# Patient Record
Sex: FEMALE | URBAN - METROPOLITAN AREA
[De-identification: names, ages, dates, MRNs, and addresses within clinical notes are randomized per-mention and may not be internally consistent; named-entity substitution may affect disease eponyms.]

---

## 2020-12-10 ENCOUNTER — APPOINTMENT (RX ONLY)
Dept: URBAN - METROPOLITAN AREA CLINIC 28 | Facility: CLINIC | Age: 86
Setting detail: DERMATOLOGY
End: 2020-12-10

## 2020-12-10 DIAGNOSIS — L29.89 OTHER PRURITUS: ICD-10-CM

## 2020-12-10 DIAGNOSIS — L29.8 OTHER PRURITUS: ICD-10-CM

## 2020-12-10 DIAGNOSIS — L85.3 XEROSIS CUTIS: ICD-10-CM

## 2020-12-10 PROCEDURE — ? COUNSELING

## 2020-12-10 PROCEDURE — ? PRESCRIPTION

## 2020-12-10 PROCEDURE — ? PRESCRIPTION MEDICATION MANAGEMENT

## 2020-12-10 PROCEDURE — 99213 OFFICE O/P EST LOW 20 MIN: CPT

## 2020-12-10 RX ORDER — CETIRIZINE HCL 1 MG/ML
SOLUTION, ORAL ORAL QD
Qty: 30 | Refills: 3 | Status: ERX | COMMUNITY
Start: 2020-12-10

## 2020-12-10 RX ORDER — AMMONIUM LACTATE 12 %
LOTION (GRAM) TOPICAL BID
Qty: 1 | Refills: 3 | Status: ERX | COMMUNITY
Start: 2020-12-10

## 2020-12-10 RX ORDER — HYDROXYZINE HYDROCHLORIDE 25 MG/1
TABLET, FILM COATED ORAL
Qty: 30 | Refills: 3 | Status: ERX | COMMUNITY
Start: 2020-12-10

## 2020-12-10 RX ADMIN — Medication: at 00:00

## 2020-12-10 RX ADMIN — HYDROXYZINE HYDROCHLORIDE: 25 TABLET, FILM COATED ORAL at 00:00

## 2020-12-10 ASSESSMENT — LOCATION DETAILED DESCRIPTION DERM
LOCATION DETAILED: PERIUMBILICAL SKIN
LOCATION DETAILED: RIGHT PROXIMAL POSTERIOR UPPER ARM
LOCATION DETAILED: SUPERIOR LUMBAR SPINE
LOCATION DETAILED: RIGHT PROXIMAL PRETIBIAL REGION
LOCATION DETAILED: INFERIOR THORACIC SPINE
LOCATION DETAILED: LEFT PROXIMAL POSTERIOR UPPER ARM
LOCATION DETAILED: LEFT PROXIMAL PRETIBIAL REGION
LOCATION DETAILED: EPIGASTRIC SKIN

## 2020-12-10 ASSESSMENT — LOCATION ZONE DERM
LOCATION ZONE: ARM
LOCATION ZONE: TRUNK
LOCATION ZONE: LEG

## 2020-12-10 ASSESSMENT — LOCATION SIMPLE DESCRIPTION DERM
LOCATION SIMPLE: ABDOMEN
LOCATION SIMPLE: LOWER BACK
LOCATION SIMPLE: LEFT PRETIBIAL REGION
LOCATION SIMPLE: UPPER BACK
LOCATION SIMPLE: LEFT POSTERIOR UPPER ARM
LOCATION SIMPLE: RIGHT POSTERIOR UPPER ARM
LOCATION SIMPLE: RIGHT PRETIBIAL REGION

## 2020-12-10 NOTE — PROCEDURE: PRESCRIPTION MEDICATION MANAGEMENT
Detail Level: Zone
Initiate Treatment: Zyrtec 10mg-take one tab PO QAM; Hydroxyzine 25mg- take one tab po QHS
Render In Strict Bullet Format?: No
Otc Regimen: CeraVe anti-itch moisturizing lotion- apply a thin layer QDAY to dry skin of body; Dove [facility] Shampoo- wash scalp QDAY

## 2021-01-14 ENCOUNTER — APPOINTMENT (RX ONLY)
Dept: URBAN - METROPOLITAN AREA CLINIC 28 | Facility: CLINIC | Age: 87
Setting detail: DERMATOLOGY
End: 2021-01-14

## 2021-01-14 DIAGNOSIS — L29.89 OTHER PRURITUS: ICD-10-CM | Status: IMPROVED

## 2021-01-14 DIAGNOSIS — L21.8 OTHER SEBORRHEIC DERMATITIS: ICD-10-CM

## 2021-01-14 DIAGNOSIS — L29.8 OTHER PRURITUS: ICD-10-CM | Status: IMPROVED

## 2021-01-14 DIAGNOSIS — L85.3 XEROSIS CUTIS: ICD-10-CM | Status: IMPROVED

## 2021-01-14 PROCEDURE — ? PRESCRIPTION

## 2021-01-14 PROCEDURE — ? COUNSELING

## 2021-01-14 PROCEDURE — ? PRESCRIPTION MEDICATION MANAGEMENT

## 2021-01-14 PROCEDURE — 99214 OFFICE O/P EST MOD 30 MIN: CPT

## 2021-01-14 RX ORDER — CLOBETASOL PROPIONATE 0.5 MG/ML
SOLUTION TOPICAL QHS
Qty: 1 | Refills: 3 | Status: ERX | COMMUNITY
Start: 2021-01-14

## 2021-01-14 RX ADMIN — CLOBETASOL PROPIONATE: 0.5 SOLUTION TOPICAL at 00:00

## 2021-01-14 ASSESSMENT — LOCATION DETAILED DESCRIPTION DERM
LOCATION DETAILED: PERIUMBILICAL SKIN
LOCATION DETAILED: EPIGASTRIC SKIN
LOCATION DETAILED: RIGHT PROXIMAL PRETIBIAL REGION
LOCATION DETAILED: LEFT SUPERIOR PARIETAL SCALP
LOCATION DETAILED: INFERIOR THORACIC SPINE
LOCATION DETAILED: LEFT PROXIMAL POSTERIOR UPPER ARM
LOCATION DETAILED: LEFT PROXIMAL PRETIBIAL REGION
LOCATION DETAILED: RIGHT PROXIMAL POSTERIOR UPPER ARM
LOCATION DETAILED: SUPERIOR LUMBAR SPINE

## 2021-01-14 ASSESSMENT — LOCATION SIMPLE DESCRIPTION DERM
LOCATION SIMPLE: LEFT PRETIBIAL REGION
LOCATION SIMPLE: UPPER BACK
LOCATION SIMPLE: ABDOMEN
LOCATION SIMPLE: LEFT POSTERIOR UPPER ARM
LOCATION SIMPLE: RIGHT POSTERIOR UPPER ARM
LOCATION SIMPLE: RIGHT PRETIBIAL REGION
LOCATION SIMPLE: LOWER BACK
LOCATION SIMPLE: SCALP

## 2021-01-14 ASSESSMENT — LOCATION ZONE DERM
LOCATION ZONE: LEG
LOCATION ZONE: SCALP
LOCATION ZONE: ARM
LOCATION ZONE: TRUNK

## 2021-01-14 NOTE — PROCEDURE: PRESCRIPTION MEDICATION MANAGEMENT
Detail Level: Zone
Continue Regimen: Zyrtec 10mg-take one tab PO QAM; \\nHydroxyzine 25mg- take one tab po QHS PRN itching
Render In Strict Bullet Format?: No
Otc Regimen: CeraVe anti-itch moisturizing lotion- apply a thin layer QDAY to dry skin of body; Dove [facility] Shampoo- wash scalp QDAY
Initiate Treatment: clobetasol 0.05 % scalp solution QHS

## 2021-08-18 ENCOUNTER — TELEPHONE (OUTPATIENT)
Dept: SCHEDULING | Facility: CLINIC | Age: 86
End: 2021-08-18

## 2021-08-18 RX ORDER — APIXABAN 2.5 MG/1
2.5 TABLET, FILM COATED ORAL
COMMUNITY
Start: 2021-08-04 | End: 2021-08-20

## 2021-08-18 RX ORDER — AMLODIPINE BESYLATE 10 MG/1
10 TABLET ORAL
COMMUNITY
Start: 2021-08-04

## 2021-08-18 NOTE — TELEPHONE ENCOUNTER
New Patient Appointment Request    Name of caller: Lesli Luz    Diagnosis: Unknown - patient mentioned taking a medication that makes her go to the bathroom often. She did not offer much info regarding her dx, just that she wants to see a new cardiologist.    Referred by:     PCP-Talat Herring MD   [facility]   PH: [phone]  Fax: [phone]    Previous Cardiologist name and phone number: (patient will call back to add cardiologists name and tele #)    Best contact number: [phone]    Additional notes: Scheduled for 9/3/21 Dr. Spencer

## 2021-08-18 NOTE — TELEPHONE ENCOUNTER
Dedrick Austin MD subspecialty is HF Nuc Cardiology.  Perhaps patient can be offered an appt with another cardiologist unless Dr. Spencer is okay with seeing patient.  Thanks.    [facility] [address]  [phone]

## 2021-08-19 PROBLEM — N18.4 CKD (CHRONIC KIDNEY DISEASE) STAGE 4, GFR 15-29 ML/MIN (CMS/HCC): Status: ACTIVE | Noted: 2020-10-06

## 2021-08-19 PROBLEM — K92.2 LOWER GI BLEED: Status: ACTIVE | Noted: 2018-08-11

## 2021-08-19 PROBLEM — E78.5 HYPERLIPIDEMIA: Status: ACTIVE | Noted: 2018-06-07

## 2021-08-19 PROBLEM — I82.409 DVT (DEEP VENOUS THROMBOSIS) (CMS/HCC): Status: ACTIVE | Noted: 2018-06-07

## 2021-08-19 PROBLEM — I10 ESSENTIAL HYPERTENSION: Status: ACTIVE | Noted: 2018-06-07

## 2021-08-19 PROBLEM — K21.9 GERD (GASTROESOPHAGEAL REFLUX DISEASE): Status: ACTIVE | Noted: 2018-06-07

## 2021-08-19 RX ORDER — LOSARTAN POTASSIUM 100 MG/1
100 TABLET ORAL DAILY
COMMUNITY

## 2021-08-19 RX ORDER — AMMONIUM LACTATE 12 G/100G
LOTION TOPICAL AS NEEDED
COMMUNITY
Start: 2021-06-22

## 2021-08-19 RX ORDER — NITROGLYCERIN 80 MG/1
1 PATCH TRANSDERMAL DAILY
COMMUNITY
Start: 2021-08-09 | End: 2021-10-08

## 2021-08-19 RX ORDER — FUROSEMIDE 20 MG/1
20 TABLET ORAL DAILY
COMMUNITY
End: 2021-08-20

## 2021-08-19 RX ORDER — CLOBETASOL PROPIONATE 0.5 MG/ML
SOLUTION TOPICAL
COMMUNITY
End: 2021-08-20

## 2021-08-19 RX ORDER — ATORVASTATIN CALCIUM 20 MG/1
20 TABLET, FILM COATED ORAL DAILY
COMMUNITY
End: 2021-08-20

## 2021-08-19 RX ORDER — PANTOPRAZOLE SODIUM 40 MG/1
40 TABLET, DELAYED RELEASE ORAL DAILY
COMMUNITY
Start: 2021-08-09

## 2021-08-19 RX ORDER — ROSUVASTATIN CALCIUM 10 MG/1
10 TABLET, COATED ORAL DAILY
COMMUNITY
Start: 2021-08-07

## 2021-08-19 RX ORDER — CELECOXIB 100 MG/1
100 CAPSULE ORAL 2 TIMES DAILY
COMMUNITY
End: 2021-08-20

## 2021-08-19 RX ORDER — OLMESARTAN MEDOXOMIL 20 MG/1
40 TABLET ORAL DAILY
COMMUNITY
End: 2021-08-20

## 2021-08-20 ENCOUNTER — OFFICE VISIT (OUTPATIENT)
Dept: CARDIOLOGY | Facility: CLINIC | Age: 86
End: 2021-08-20
Payer: COMMERCIAL

## 2021-08-20 VITALS
HEART RATE: 70 BPM | WEIGHT: 153 LBS | SYSTOLIC BLOOD PRESSURE: 144 MMHG | BODY MASS INDEX: 28.89 KG/M2 | HEIGHT: 61 IN | DIASTOLIC BLOOD PRESSURE: 80 MMHG | OXYGEN SATURATION: 98 %

## 2021-08-20 DIAGNOSIS — R07.2 PRECORDIAL PAIN: ICD-10-CM

## 2021-08-20 DIAGNOSIS — I10 HYPERTENSION, UNSPECIFIED TYPE: Primary | ICD-10-CM

## 2021-08-20 DIAGNOSIS — I10 ESSENTIAL HYPERTENSION: ICD-10-CM

## 2021-08-20 PROBLEM — R07.9 CHEST PAIN: Status: ACTIVE | Noted: 2021-08-20

## 2021-08-20 PROBLEM — I82.509 CHRONIC DEEP VEIN THROMBOSIS (DVT) OF LOWER EXTREMITY (CMS/HCC): Status: ACTIVE | Noted: 2018-06-07

## 2021-08-20 PROCEDURE — 99204 OFFICE O/P NEW MOD 45 MIN: CPT | Performed by: INTERNAL MEDICINE

## 2021-08-20 PROCEDURE — 3008F BODY MASS INDEX DOCD: CPT | Performed by: INTERNAL MEDICINE

## 2021-08-20 PROCEDURE — 93000 ELECTROCARDIOGRAM COMPLETE: CPT | Performed by: INTERNAL MEDICINE

## 2021-08-20 RX ORDER — DOCUSATE SODIUM 100 MG/1
100 CAPSULE, LIQUID FILLED ORAL DAILY
COMMUNITY

## 2021-08-20 RX ORDER — FUROSEMIDE 40 MG/1
40 TABLET ORAL DAILY PRN
COMMUNITY

## 2021-08-20 ASSESSMENT — ENCOUNTER SYMPTOMS
ABDOMINAL PAIN: 0
DIZZINESS: 0
DYSURIA: 0
MYALGIAS: 0
WHEEZING: 0
CONSTIPATION: 1
FATIGUE: 0
BRUISES/BLEEDS EASILY: 0
NERVOUS/ANXIOUS: 0
BLOOD IN STOOL: 1
PALPITATIONS: 0
SHORTNESS OF BREATH: 1
ARTHRALGIAS: 1
APPETITE CHANGE: 0

## 2021-08-20 ASSESSMENT — PAIN SCALES - GENERAL: PAINLEVEL: 0-NO PAIN

## 2021-08-20 NOTE — ASSESSMENT & PLAN NOTE
Her chest pain is noncardiac in nature.  It does not sound typical for pericarditis or coronary ischemia.  Neither does it sound typical for GERD or costochondritis.  I do not believe she requires any cardiac testing in this regard, but I will defer to Dr. Mcghee if she feels that an ischemic evaluation is warranted.

## 2021-08-20 NOTE — PROGRESS NOTES
Electrophysiology       Reason for visit:   Chief Complaint   Patient presents with   • Hypertension   • Shortness of Breath   • Chest Pain      HPI   Lesli Luz is a 89 y.o. female who comes to the office today to establish new cardiovascular care because she is disappointed with the care she was getting from her previous cardiologist.  I do not have her previous cardiologist's notes and she gives an incomplete history.    She has a history of hypertension and no other known coronary artery disease.  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  Her main physical complaint is intermittent epigastric discomfort and shortness of breath.  The symptoms occur usually when she is standing for prolonged period.  She finds that if she drinks water or if she starts walking, the symptoms resolve.  She also describes palpitations.  When I asked her to describe them further she says her heart occasionally feels as if it is beating fast, but it does not last for more than a few minutes.  She does not describe an irregular heartbeat.  She denies symptoms that sound like angina.  She tells me that she had a stress test done at her previous cardiologist office, although I see only evidence of a previous echocardiogram in her chart and no previous stress test.        Past Medical History:   Diagnosis Date   • Chronic kidney disease    • Deep vein thrombosis (CMS/HCC)    • Hypertension    • Lipid disorder      History reviewed. No pertinent surgical history.     Allergies:  Patient has no known allergies.    Current Outpatient Medications   Medication Sig Dispense Refill   • amLODIPine (NORVASC) 10 mg tablet Take 10 mg by mouth once daily. for high blood pressure     • ammonium lactate (LAC-HYDRIN) 12 % lotion Apply topically as needed.     • apixaban (ELIQUIS) 2.5 mg tablet Take 2.5 mg by mouth 2 (two) times a day.     • docusate sodium (COLACE) 100 mg  capsule Take 100 mg by mouth daily.     • furosemide (LASIX) 40 mg tablet Take 40 mg by mouth daily.     • losartan (COZAAR) 100 mg tablet Take 100 mg by mouth daily.     • nitroglycerin (NITRODUR) 0.4 mg/hr 1 patch daily.     • pantoprazole (PROTONIX) 40 mg EC tablet Take 40 mg by mouth daily.     • rosuvastatin (CRESTOR) 10 mg tablet Take 10 mg by mouth daily.       No current facility-administered medications for this visit.     Social History     Socioeconomic History   • Marital status:      Spouse name: None   • Number of children: None   • Years of education: None   • Highest education level: None   Occupational History   • None   Tobacco Use   • Smoking status: Former Smoker   • Smokeless tobacco: Never Used   Vaping Use   • Vaping Use: Never used   Substance and Sexual Activity   • Alcohol use: Not Currently   • Drug use: Never   • Sexual activity: None   Other Topics Concern   • None   Social History Narrative   • None     Social Determinants of Health     Financial Resource Strain:    • Difficulty of Paying Living Expenses:    Food Insecurity:    • Worried About Running Out of Food in the Last Year:    • Ran Out of Food in the Last Year:    Transportation Needs:    • Lack of Transportation (Medical):    • Lack of Transportation (Non-Medical):    Physical Activity:    • Days of Exercise per Week:    • Minutes of Exercise per Session:    Stress:    • Feeling of Stress :    Social Connections:    • Frequency of Communication with Friends and Family:    • Frequency of Social Gatherings with Friends and Family:    • Attends Islam Services:    • Active Member of Clubs or Organizations:    • Attends Club or Organization Meetings:    • Marital Status:    Intimate Partner Violence:    • Fear of Current or Ex-Partner:    • Emotionally Abused:    • Physically Abused:    • Sexually Abused:      Family History   Problem Relation Age of Onset   • Cancer Biological Mother    • Breast cancer Biological Sister     • Diabetes Biological Sister      Review of Systems   Constitutional: Negative for appetite change and fatigue.   HENT: Negative for hearing loss and nosebleeds.    Eyes: Negative for visual disturbance.   Respiratory: Positive for shortness of breath. Negative for wheezing.    Cardiovascular: Positive for chest pain. Negative for palpitations.   Gastrointestinal: Positive for blood in stool and constipation. Negative for abdominal pain.   Genitourinary: Negative for dysuria.   Musculoskeletal: Positive for arthralgias. Negative for myalgias.   Skin: Negative for rash.   Neurological: Negative for dizziness.   Hematological: Does not bruise/bleed easily.   Psychiatric/Behavioral: The patient is not nervous/anxious.      Vitals:    08/20/21 0853   BP: (!) 144/80   Pulse: 70   SpO2: 98%     Wt Readings from Last 3 Encounters:   08/20/21 69.4 kg (153 lb)     Physical Exam  Constitutional:       General: She is not in acute distress.     Appearance: She is well-developed.   HENT:      Head: Atraumatic.   Eyes:      General: No scleral icterus.  Neck:      Thyroid: No thyromegaly.      Vascular: No carotid bruit.   Cardiovascular:      Rate and Rhythm: Regular rhythm.      Heart sounds: No murmur heard.     Pulmonary:      Effort: No respiratory distress.      Breath sounds: Normal breath sounds.   Abdominal:      Palpations: Abdomen is soft.      Tenderness: There is no abdominal tenderness.   Musculoskeletal:         General: No deformity.      Comments: Mild bilateral lower extremity edema, left greater than right   Skin:     Findings: No rash.   Neurological:      Mental Status: She is alert.      Motor: No abnormal muscle tone.          Labs and test results personally reviewed by me:    Labs from 11/4/2020 include CBC, comprehensive metabolic panel and troponin.  Abnormalities include a hemoglobin of 11.0, creatinine 2.3 and otherwise normal findings.    Transthoracic echo 10/7/2020: Normal LV size and function  with mild concentric LVH with EF 60% and mild diastolic dysfunction.  There are no regional wall motion abnormalities.  Mild left atrial enlargement and mild mitral regurgitation.    ECG personally read and interpreted by me today: Sinus bradycardia at 56 bpm with a slight leftward axis and no other abnormalities.         Chest pain  Her chest pain is noncardiac in nature.  It does not sound typical for pericarditis or coronary ischemia.  Neither does it sound typical for GERD or costochondritis.  I do not believe she requires any cardiac testing in this regard, but I will defer to Dr. Mcghee if she feels that an ischemic evaluation is warranted.    Essential hypertension  Her most pressing active cardiovascular issue is hypertension that is only modestly controlled on a fairly aggressive regimen.  She will need additional blood pressure control to minimize her risk of long-term cardiovascular complications and, more importantly to protect her kidneys which already show significant renal impairment.    She has no active electrophysiology issues and her cardiovascular needs are largely noninvasive.  I have asked her to see my partner Dr. Randal Mcghee for ongoing management of her cardiovascular issues.  She will schedule follow-up at her convenience.        This letter was generated using speech recognition software. Please excuse any typographical errors.       Golden Spencer MD  8/20/2021

## 2021-08-20 NOTE — ASSESSMENT & PLAN NOTE
Her most pressing active cardiovascular issue is hypertension that is only modestly controlled on a fairly aggressive regimen.  She will need additional blood pressure control to minimize her risk of long-term cardiovascular complications and, more importantly to protect her kidneys which already show significant renal impairment.    She has no active electrophysiology issues and her cardiovascular needs are largely noninvasive.  I have asked her to see my partner Dr. Randal Mcghee for ongoing management of her cardiovascular issues.  She will schedule follow-up at her convenience.

## 2021-08-20 NOTE — LETTER
August 20, 2021     Ricardo Garcia MD  [address]  [facility] [address]    Patient: Lesli Luz  YOB: 1932  Date of Visit: 8/20/2021      Dear Dr. Garcia:    Thank you for referring Lesli Luz to me for evaluation. Below are my notes for this consultation.    If you have questions, please do not hesitate to call me. I look forward to following your patient along with you.         Sincerely,        Golden Spencer MD        CC: MD Randal Cottrell MD Esberg, Douglas B, MD  8/20/2021  9:45 AM  Signed       Electrophysiology       Reason for visit:   Chief Complaint   Patient presents with   • Hypertension   • Shortness of Breath   • Chest Pain      HPI   Lesli Luz is a 89 y.o. female who comes to the office today to establish new cardiovascular care because she is disappointed with the care she was getting from her previous cardiologist.  I do not have her previous cardiologist's notes and she gives an incomplete history.    She has a history of hypertension and no other known coronary artery disease.  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  Her main physical complaint is intermittent epigastric discomfort and shortness of breath.  The symptoms occur usually when she is standing for prolonged period.  She finds that if she drinks water or if she starts walking, the symptoms resolve.  She also describes palpitations.  When I asked her to describe them further she says her heart occasionally feels as if it is beating fast, but it does not last for more than a few minutes.  She does not describe an irregular heartbeat.  She denies symptoms that sound like angina.  She tells me that she had a stress test done at her previous cardiologist office, although I see only evidence of a previous echocardiogram in her chart and no previous stress test.        Past Medical History:   Diagnosis Date    • Chronic kidney disease    • Deep vein thrombosis (CMS/HCC)    • Hypertension    • Lipid disorder      History reviewed. No pertinent surgical history.     Allergies:  Patient has no known allergies.    Current Outpatient Medications   Medication Sig Dispense Refill   • amLODIPine (NORVASC) 10 mg tablet Take 10 mg by mouth once daily. for high blood pressure     • ammonium lactate (LAC-HYDRIN) 12 % lotion Apply topically as needed.     • apixaban (ELIQUIS) 2.5 mg tablet Take 2.5 mg by mouth 2 (two) times a day.     • docusate sodium (COLACE) 100 mg capsule Take 100 mg by mouth daily.     • furosemide (LASIX) 40 mg tablet Take 40 mg by mouth daily.     • losartan (COZAAR) 100 mg tablet Take 100 mg by mouth daily.     • nitroglycerin (NITRODUR) 0.4 mg/hr 1 patch daily.     • pantoprazole (PROTONIX) 40 mg EC tablet Take 40 mg by mouth daily.     • rosuvastatin (CRESTOR) 10 mg tablet Take 10 mg by mouth daily.       No current facility-administered medications for this visit.     Social History     Socioeconomic History   • Marital status:      Spouse name: None   • Number of children: None   • Years of education: None   • Highest education level: None   Occupational History   • None   Tobacco Use   • Smoking status: Former Smoker   • Smokeless tobacco: Never Used   Vaping Use   • Vaping Use: Never used   Substance and Sexual Activity   • Alcohol use: Not Currently   • Drug use: Never   • Sexual activity: None   Other Topics Concern   • None   Social History Narrative   • None     Social Determinants of Health     Financial Resource Strain:    • Difficulty of Paying Living Expenses:    Food Insecurity:    • Worried About Running Out of Food in the Last Year:    • Ran Out of Food in the Last Year:    Transportation Needs:    • Lack of Transportation (Medical):    • Lack of Transportation (Non-Medical):    Physical Activity:    • Days of Exercise per Week:    • Minutes of Exercise per Session:    Stress:    •  Feeling of Stress :    Social Connections:    • Frequency of Communication with Friends and Family:    • Frequency of Social Gatherings with Friends and Family:    • Attends Rastafarian Services:    • Active Member of Clubs or Organizations:    • Attends Club or Organization Meetings:    • Marital Status:    Intimate Partner Violence:    • Fear of Current or Ex-Partner:    • Emotionally Abused:    • Physically Abused:    • Sexually Abused:      Family History   Problem Relation Age of Onset   • Cancer Biological Mother    • Breast cancer Biological Sister    • Diabetes Biological Sister      Review of Systems   Constitutional: Negative for appetite change and fatigue.   HENT: Negative for hearing loss and nosebleeds.    Eyes: Negative for visual disturbance.   Respiratory: Positive for shortness of breath. Negative for wheezing.    Cardiovascular: Positive for chest pain. Negative for palpitations.   Gastrointestinal: Positive for blood in stool and constipation. Negative for abdominal pain.   Genitourinary: Negative for dysuria.   Musculoskeletal: Positive for arthralgias. Negative for myalgias.   Skin: Negative for rash.   Neurological: Negative for dizziness.   Hematological: Does not bruise/bleed easily.   Psychiatric/Behavioral: The patient is not nervous/anxious.      Vitals:    08/20/21 0853   BP: (!) 144/80   Pulse: 70   SpO2: 98%     Wt Readings from Last 3 Encounters:   08/20/21 69.4 kg (153 lb)     Physical Exam  Constitutional:       General: She is not in acute distress.     Appearance: She is well-developed.   HENT:      Head: Atraumatic.   Eyes:      General: No scleral icterus.  Neck:      Thyroid: No thyromegaly.      Vascular: No carotid bruit.   Cardiovascular:      Rate and Rhythm: Regular rhythm.      Heart sounds: No murmur heard.     Pulmonary:      Effort: No respiratory distress.      Breath sounds: Normal breath sounds.   Abdominal:      Palpations: Abdomen is soft.      Tenderness: There is  no abdominal tenderness.   Musculoskeletal:         General: No deformity.      Comments: Mild bilateral lower extremity edema, left greater than right   Skin:     Findings: No rash.   Neurological:      Mental Status: She is alert.      Motor: No abnormal muscle tone.          Labs and test results personally reviewed by me:    Labs from 11/4/2020 include CBC, comprehensive metabolic panel and troponin.  Abnormalities include a hemoglobin of 11.0, creatinine 2.3 and otherwise normal findings.    Transthoracic echo 10/7/2020: Normal LV size and function with mild concentric LVH with EF 60% and mild diastolic dysfunction.  There are no regional wall motion abnormalities.  Mild left atrial enlargement and mild mitral regurgitation.    ECG personally read and interpreted by me today: Sinus bradycardia at 56 bpm with a slight leftward axis and no other abnormalities.         Chest pain  Her chest pain is noncardiac in nature.  It does not sound typical for pericarditis or coronary ischemia.  Neither does it sound typical for GERD or costochondritis.  I do not believe she requires any cardiac testing in this regard, but I will defer to Dr. Mcghee if she feels that an ischemic evaluation is warranted.    Essential hypertension  Her most pressing active cardiovascular issue is hypertension that is only modestly controlled on a fairly aggressive regimen.  She will need additional blood pressure control to minimize her risk of long-term cardiovascular complications and, more importantly to protect her kidneys which already show significant renal impairment.    She has no active electrophysiology issues and her cardiovascular needs are largely noninvasive.  I have asked her to see my partner Dr. Randal Mcghee for ongoing management of her cardiovascular issues.  She will schedule follow-up at her convenience.        This letter was generated using speech recognition software. Please excuse any typographical errors.       Golden  ED Spencer MD  8/20/2021

## 2021-10-05 ENCOUNTER — TELEPHONE (OUTPATIENT)
Dept: CARDIOLOGY | Facility: CLINIC | Age: 86
End: 2021-10-05

## 2021-10-08 ENCOUNTER — OFFICE VISIT (OUTPATIENT)
Dept: CARDIOLOGY | Facility: CLINIC | Age: 86
End: 2021-10-08
Payer: COMMERCIAL

## 2021-10-08 VITALS
HEART RATE: 70 BPM | HEIGHT: 61 IN | BODY MASS INDEX: 28.51 KG/M2 | RESPIRATION RATE: 16 BRPM | DIASTOLIC BLOOD PRESSURE: 72 MMHG | SYSTOLIC BLOOD PRESSURE: 110 MMHG | WEIGHT: 151 LBS

## 2021-10-08 DIAGNOSIS — I10 PRIMARY HYPERTENSION: Primary | ICD-10-CM

## 2021-10-08 PROCEDURE — 99204 OFFICE O/P NEW MOD 45 MIN: CPT | Performed by: INTERNAL MEDICINE

## 2021-10-08 PROCEDURE — 3008F BODY MASS INDEX DOCD: CPT | Performed by: INTERNAL MEDICINE

## 2021-10-08 PROCEDURE — 93000 ELECTROCARDIOGRAM COMPLETE: CPT | Performed by: INTERNAL MEDICINE

## 2021-10-08 NOTE — LETTER
October 8, 2021     Tracey Bills DO  [address] [facility] [address]    Patient: Lesli Luz  YOB: 1932  Date of Visit: 10/8/2021      Dear Dr. Bills:    Thank you for referring Lesli Luz to me for evaluation. Below are my notes for this consultation.    If you have questions, please do not hesitate to call me. I look forward to following your patient along with you.         Sincerely,        Randal Mcghee MD        CC: MD Taz Cottrell Riti, MD  10/8/2021  1:42 PM  Signed       Cardiology Outpatient Note    [facility] Office  [address] [place] [address]     [facility]  The [facility]  [address] [place] [address]     TEL  [phone]  [facility].[employer]/[facility]     Lesli Luz is a 89 y.o. female patient here for cardiac evaluation.  She has general cardiology issues but saw Dr. Spencer inadvertently 8-2021 had no electrophysiologic issues and was asked to see general cardiology.    This history is taken from review of the chart.  The patient's history is not very specific.    She initially went to see Dr. Spencer hoping to change her cardiology care.  She had been seen at [facility] 1-2021 with right-sided discomfort.  As best as I can tell from the notes and her history, she did not have a cardiac etiology for her symptoms which were felt to be musculoskeletal but had elevated blood pressure 154/112 mmHg as documented and was asked to see cardiology.  She saw cardiology as an outpatient in office on [facility] with a cardiologist whose name she cannot remember.  He was concerned she was having cardiac discomfort when she described having occasional epigastric discomfort.  He gave her nitroglycerin patch.  He started her on rosuvastatin.  He discontinued her nifedipine.  He put her on some other type of medication which she said she felt poorly on and then when he  increased the dose she was hallucinating.  When she told him this, he asked if she could bring in her unused medication so he could give it to someone else.  In general she did not like that recommendation and decided to switch care.    She has a history of hypertension.  No history of diabetes congestive heart failure rheumatic heart disease.  She said she had a stroke in the 1980s but had forgotten all about it.  She says her symptoms at that time consisted of perioral numbness which she currently does not have.  She denies a history of myocardial infarction, cardiac stenting, or cardiac surgery.    Her past medical history is notable for deep venous thrombosis lower extremities.  She said she first had them in 1997.  They recurred a few years later.  She says no one ever told her why she had those.  She was initially on warfarin which she said did not work.  She denies bleeding at that time.  She is currently on apixaban, managed by her primary care physician.  I have no notes or prior history or records to review.    She also has a history of chronic kidney disease with last creatinine 2022.3 and sees Dr. Tracey Bills.    Patient says that she was at a [employer] yesterday and walked a lot.  She thinks that may have improved her blood pressure today.  She denies any chest pain with ambulation.  She says occasionally she does get epigastric pain but cannot cite any mitigating or exacerbating factors.  She denies any palpitations.  She denies any diaphoresis.  She denies any visual changes.  She describes bright red blood per rectum with straining bowel movement suggestive of hemorrhoids and says she is having that worked up and has spoken with her primary care physician.  She says she previously smoked.  No clear family history of accelerated coronary artery disease.  She has had 2 pregnancies and no history of preeclampsia.                                                         [facility]     Medical History:  Past  Medical History:   Diagnosis Date   • Chronic kidney disease    • Deep vein thrombosis (CMS/HCC)    • Hypertension    • Lipid disorder        Surgical History:History reviewed. No pertinent surgical history.    Social History: reports that she has quit smoking. She has never used smokeless tobacco. She reports previous alcohol use. She reports that she does not use drugs.    Family History: She indicated that her biological mother is . She indicated that the status of her biological sister is unknown.      Allergies:Patient has no known allergies.    Current Medications:    Outpatient Encounter Medications as of 10/8/2021:   •  amLODIPine (NORVASC) 10 mg tablet, Take 10 mg by mouth once daily. for high blood pressure  •  ammonium lactate (LAC-HYDRIN) 12 % lotion, Apply topically as needed.  •  apixaban (ELIQUIS) 2.5 mg tablet, Take 2.5 mg by mouth 2 (two) times a day.  •  docusate sodium (COLACE) 100 mg capsule, Take 100 mg by mouth daily.  •  furosemide (LASIX) 40 mg tablet, Take 40 mg by mouth daily.  •  losartan (COZAAR) 100 mg tablet, Take 100 mg by mouth daily.  •  pantoprazole (PROTONIX) 40 mg EC tablet, Take 40 mg by mouth daily.  •  rosuvastatin (CRESTOR) 10 mg tablet, Take 10 mg by mouth daily.  •  [DISCONTINUED] nitroglycerin (NITRODUR) 0.4 mg/hr, 1 patch daily.                                                          OBJECTIVE   ROS as in HPI   Constitution: Negative for chills and fever.   Eyes: Negative for blurred vision and visual disturbance.   Cardiovascular: Negative for anginal chest pain, dyspnea on exertion, near-syncope, palpitations and syncope.   Respiratory: Negative for hemoptysis, shortness of breath and wheezing.    Hematologic/Lymphatic: Negative for bleeding problem.   Skin: Negative for rash. No new skin changes  Gastrointestinal: Occasional epigastric discomfort but no, diarrhea, hematochezia, melena, nausea and vomiting.   Genitourinary: Negative for dysuria and hematuria.  "  Neurological: Negative for headaches.   No history of HIV, hepatitis, opportunistic infection or blood transfusions         Vitals:    10/08/21 1255   BP: 110/72   BP Location: Left upper arm   Patient Position: Sitting   Pulse: 70   Resp: 16   Weight: 68.5 kg (151 lb)   Height: 1.549 m (5' 1\")       BP Readings from Last 3 Encounters:   10/08/21 110/72   08/20/21 (!) 144/80     Wt Readings from Last 3 Encounters:   10/08/21 68.5 kg (151 lb)   08/20/21 69.4 kg (153 lb)       Physical Exam   Constitutional: Appears comfortable in no distress  HEENT:  Neck Supple.  No JVD No carotid bruits no cervical lymphadenopathy  Head: Normocephalic.   Eyes: Extraocular movements intact  Cardiovascular: Normal rate, regular rhythm 2 out of 6 systolic murmur right sternal border Exam reveals no friction rub.    Pulmonary/Chest: Effort normal and breath sounds normal. No wheezes.  Abdominal: Soft and nontender.   Musculoskeletal: No lower extremity edema.   Neurological: Alert and oriented to person, place, and time.  Stable gait but patient says she normally walks with a cane  Skin: Skin is warm and dry.   Psychiatric: Behavior is normal.            Objective   No results found for: CHOL  No results found for: HDL  No results found for: LDLCALC  No results found for: TRIG  No results found for: ALT, AST  No results found for: WBC, HGB, HCT, PLT, INR  No results found for: GLUCOSE, NA, K, CO2, CL, BUN, CREATININE  No results found for: HGBA1C  No results found for: TSH  No results found for: BNP  [unfilled]    Troponin I Results    No lab values to display.                                                            IMAGING                     EKG 10/8/2021   Sinus  Rhythm 70bpm [id number]  Low voltage in precordial leads.    -Poor R-wave progression                                            ASSESSMENT AND PLAN   Ms. Luz is an 89-year-old woman with the following issues:    Assessment/Plan    Chest pain  Patient has very atypical " chest discomfort.  She thinks it is GI in origin.  No clear exacerbation or initiation with walking or exertion.    Recommend adequate blood pressure control, baseline echocardiogram, and patient will discontinue her nitroglycerin patch.  She has been asked to bring all her medications at time of next visit.  She is doing well and ideally testing should be minimized unless there is a clear benefit.  She agrees with this approach.    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  Currently on apixaban.    Essential hypertension  Blood pressure is currently controlled and she sees nephrology as well.  I have asked her to bring her medications at time of next visit.    Hyperlipidemia  Blood work  shows  triglycerides 69 HDL 73 normal liver function test.  In light of patient's age, the benefit of statin is questionable and I explained this to her.  She is however interested in taking anything that she thinks will help her.  She is currently on rosuvastatin as prescribed by her previous cardiologist.  She will continue for now and may have blood work in the future to see if there is an improvement in her lipid profile.              Return in about 4 weeks (around 11/5/2021).         Thank you for allowing me to participate in the care of this patient.  I hope this information is helpful.         Randal Mcghee MD [facility]  10/8/2021  1:41 PM    This document was generated utilizing voice recognition technology. A reasonable attempt at proofreading has been made to minimize errors but please excuse any typographical errors which may be present. Please call with any questions.

## 2021-10-08 NOTE — ASSESSMENT & PLAN NOTE
Patient has very atypical chest discomfort.  She thinks it is GI in origin.  No clear exacerbation or initiation with walking or exertion.    Recommend adequate blood pressure control, baseline echocardiogram, and patient will discontinue her nitroglycerin patch.  She has been asked to bring all her medications at time of next visit.  She is doing well and ideally testing should be minimized unless there is a clear benefit.  She agrees with this approach.

## 2021-10-08 NOTE — ASSESSMENT & PLAN NOTE
Blood work  shows  triglycerides 69 HDL 73 normal liver function test.  In light of patient's age, the benefit of statin is questionable and I explained this to her.  She is however interested in taking anything that she thinks will help her.  She is currently on rosuvastatin as prescribed by her previous cardiologist.  She will continue for now and may have blood work in the future to see if there is an improvement in her lipid profile.

## 2021-10-08 NOTE — ASSESSMENT & PLAN NOTE
Blood pressure is currently controlled and she sees nephrology as well.  I have asked her to bring her medications at time of next visit.

## 2021-10-08 NOTE — PROGRESS NOTES
Cardiology Outpatient Note    [facility] HEART [facility] Office  [address] [place] [address]     [facility]  The Heart [address] [facility]  [address] [place] [address]     TEL  [phone]  [facility]/mlhc     Lesli Luz is a 89 y.o. female patient here for cardiac evaluation.  She has general cardiology issues but saw Dr. Spencer inadvertently 8-2021 had no electrophysiologic issues and was asked to see general cardiology.    This history is taken from review of the chart.  The patient's history is not very specific.    She initially went to see Dr. Spencer hoping to change her cardiology care.  She had been seen at [facility] 1-2021 with right-sided discomfort.  As best as I can tell from the notes and her history, she did not have a cardiac etiology for her symptoms which were felt to be musculoskeletal but had elevated blood pressure 154/112 mmHg as documented and was asked to see cardiology.  She saw cardiology as an outpatient in office on [facility] with a cardiologist whose name she cannot remember.  He was concerned she was having cardiac discomfort when she described having occasional epigastric discomfort.  He gave her nitroglycerin patch.  He started her on rosuvastatin.  He discontinued her nifedipine.  He put her on some other type of medication which she said she felt poorly on and then when he increased the dose she was hallucinating.  When she told him this, he asked if she could bring in her unused medication so he could give it to someone else.  In general she did not like that recommendation and decided to switch care.    She has a history of hypertension.  No history of diabetes congestive heart failure rheumatic heart disease.  She said she had a stroke in the 1980s but had forgotten all about it.  She says her symptoms at that time consisted of perioral numbness which she currently does not have.  She denies a  history of myocardial infarction, cardiac stenting, or cardiac surgery.    Her past medical history is notable for deep venous thrombosis lower extremities.  She said she first had them in .  They recurred a few years later.  She says no one ever told her why she had those.  She was initially on warfarin which she said did not work.  She denies bleeding at that time.  She is currently on apixaban, managed by her primary care physician.  I have no notes or prior history or records to review.    She also has a history of chronic kidney disease with last creatinine .3 and sees Dr. Tracey Bills.    Patient says that she was at a [employer] yesterday and walked a lot.  She thinks that may have improved her blood pressure today.  She denies any chest pain with ambulation.  She says occasionally she does get epigastric pain but cannot cite any mitigating or exacerbating factors.  She denies any palpitations.  She denies any diaphoresis.  She denies any visual changes.  She describes bright red blood per rectum with straining bowel movement suggestive of hemorrhoids and says she is having that worked up and has spoken with her primary care physician.  She says she previously smoked.  No clear family history of accelerated coronary artery disease.  She has had 2 pregnancies and no history of preeclampsia.                                                         [facility]     Medical History:  Past Medical History:   Diagnosis Date   • Chronic kidney disease    • Deep vein thrombosis (CMS/HCC)    • Hypertension    • Lipid disorder        Surgical History:History reviewed. No pertinent surgical history.    Social History: reports that she has quit smoking. She has never used smokeless tobacco. She reports previous alcohol use. She reports that she does not use drugs.    Family History: She indicated that her biological mother is . She indicated that the status of her biological sister is  "unknown.      Allergies:Patient has no known allergies.    Current Medications:    Outpatient Encounter Medications as of 10/8/2021:   •  amLODIPine (NORVASC) 10 mg tablet, Take 10 mg by mouth once daily. for high blood pressure  •  ammonium lactate (LAC-HYDRIN) 12 % lotion, Apply topically as needed.  •  apixaban (ELIQUIS) 2.5 mg tablet, Take 2.5 mg by mouth 2 (two) times a day.  •  docusate sodium (COLACE) 100 mg capsule, Take 100 mg by mouth daily.  •  furosemide (LASIX) 40 mg tablet, Take 40 mg by mouth daily.  •  losartan (COZAAR) 100 mg tablet, Take 100 mg by mouth daily.  •  pantoprazole (PROTONIX) 40 mg EC tablet, Take 40 mg by mouth daily.  •  rosuvastatin (CRESTOR) 10 mg tablet, Take 10 mg by mouth daily.  •  [DISCONTINUED] nitroglycerin (NITRODUR) 0.4 mg/hr, 1 patch daily.                                                          OBJECTIVE   ROS as in HPI   Constitution: Negative for chills and fever.   Eyes: Negative for blurred vision and visual disturbance.   Cardiovascular: Negative for anginal chest pain, dyspnea on exertion, near-syncope, palpitations and syncope.   Respiratory: Negative for hemoptysis, shortness of breath and wheezing.    Hematologic/Lymphatic: Negative for bleeding problem.   Skin: Negative for rash. No new skin changes  Gastrointestinal: Occasional epigastric discomfort but no, diarrhea, hematochezia, melena, nausea and vomiting.   Genitourinary: Negative for dysuria and hematuria.   Neurological: Negative for headaches.   No history of HIV, hepatitis, opportunistic infection or blood transfusions         Vitals:    10/08/21 1255   BP: 110/72   BP Location: Left upper arm   Patient Position: Sitting   Pulse: 70   Resp: 16   Weight: 68.5 kg (151 lb)   Height: 1.549 m (5' 1\")       BP Readings from Last 3 Encounters:   10/08/21 110/72   08/20/21 (!) 144/80     Wt Readings from Last 3 Encounters:   10/08/21 68.5 kg (151 lb)   08/20/21 69.4 kg (153 lb)       Physical Exam "   Constitutional: Appears comfortable in no distress  HEENT:  Neck Supple.  No JVD No carotid bruits no cervical lymphadenopathy  Head: Normocephalic.   Eyes: Extraocular movements intact  Cardiovascular: Normal rate, regular rhythm 2 out of 6 systolic murmur right sternal border Exam reveals no friction rub.    Pulmonary/Chest: Effort normal and breath sounds normal. No wheezes.  Abdominal: Soft and nontender.   Musculoskeletal: No lower extremity edema.   Neurological: Alert and oriented to person, place, and time.  Stable gait but patient says she normally walks with a cane  Skin: Skin is warm and dry.   Psychiatric: Behavior is normal.            Objective   No results found for: CHOL  No results found for: HDL  No results found for: LDLCALC  No results found for: TRIG  No results found for: ALT, AST  No results found for: WBC, HGB, HCT, PLT, INR  No results found for: GLUCOSE, NA, K, CO2, CL, BUN, CREATININE  No results found for: HGBA1C  No results found for: TSH  No results found for: BNP  [unfilled]    Troponin I Results    No lab values to display.                                                            IMAGING                     EKG 10/8/2021   Sinus  Rhythm 70bpm [id number]  Low voltage in precordial leads.    -Poor R-wave progression                                            ASSESSMENT AND PLAN   Ms. Luz is an 89-year-old woman with the following issues:    Assessment/Plan    Chest pain  Patient has very atypical chest discomfort.  She thinks it is GI in origin.  No clear exacerbation or initiation with walking or exertion.    Recommend adequate blood pressure control, baseline echocardiogram, and patient will discontinue her nitroglycerin patch.  She has been asked to bring all her medications at time of next visit.  She is doing well and ideally testing should be minimized unless there is a clear benefit.  She agrees with this approach.    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  Currently on apixaban.    Essential hypertension  Blood pressure is currently controlled and she sees nephrology as well.  I have asked her to bring her medications at time of next visit.    Hyperlipidemia  Blood work  shows  triglycerides 69 HDL 73 normal liver function test.  In light of patient's age, the benefit of statin is questionable and I explained this to her.  She is however interested in taking anything that she thinks will help her.  She is currently on rosuvastatin as prescribed by her previous cardiologist.  She will continue for now and may have blood work in the future to see if there is an improvement in her lipid profile.              Return in about 4 weeks (around 11/5/2021).         Thank you for allowing me to participate in the care of this patient.  I hope this information is helpful.         Randal Mcghee MD [facility]  10/8/2021  1:41 PM    This document was generated utilizing voice recognition technology. A reasonable attempt at proofreading has been made to minimize errors but please excuse any typographical errors which may be present. Please call with any questions.

## 2021-10-28 ENCOUNTER — TELEPHONE (OUTPATIENT)
Dept: CARDIOLOGY | Facility: CLINIC | Age: 86
End: 2021-10-28

## 2021-10-28 NOTE — TELEPHONE ENCOUNTER
Norberto Ballesteros is scheduled for an ECHO in the [facility] office on 11-  Insurance is [employer] medicare ID # [id number]   1932    Will need precert  thx

## 2021-11-10 ENCOUNTER — OFFICE VISIT (OUTPATIENT)
Dept: CARDIOLOGY | Facility: CLINIC | Age: 86
End: 2021-11-10
Payer: COMMERCIAL

## 2021-11-10 ENCOUNTER — HOSPITAL ENCOUNTER (OUTPATIENT)
Dept: CARDIOLOGY | Facility: CLINIC | Age: 86
Discharge: HOME | End: 2021-11-10
Attending: INTERNAL MEDICINE
Payer: COMMERCIAL

## 2021-11-10 VITALS
SYSTOLIC BLOOD PRESSURE: 126 MMHG | WEIGHT: 144 LBS | BODY MASS INDEX: 27.19 KG/M2 | DIASTOLIC BLOOD PRESSURE: 70 MMHG | HEIGHT: 61 IN

## 2021-11-10 VITALS
HEART RATE: 58 BPM | SYSTOLIC BLOOD PRESSURE: 126 MMHG | RESPIRATION RATE: 16 BRPM | DIASTOLIC BLOOD PRESSURE: 60 MMHG | BODY MASS INDEX: 27.26 KG/M2 | HEIGHT: 61 IN | WEIGHT: 144.4 LBS

## 2021-11-10 DIAGNOSIS — I10 PRIMARY HYPERTENSION: Primary | ICD-10-CM

## 2021-11-10 DIAGNOSIS — I10 PRIMARY HYPERTENSION: ICD-10-CM

## 2021-11-10 LAB
AORTIC ROOT ANNULUS - M-MODE: 3 CM
AORTIC ROOT ANNULUS: 2.9 CM
ASCENDING AORTA: 3 CM
AV PEAK GRADIENT: 15 MMHG
AV PEAK VELOCITY-S: 1.93 M/S
AV VALVE AREA: 2.08 CM2
BSA FOR ECHO PROCEDURE: 1.68 M2
CUSP SEPARATION: 1.8 CM
DOP CALC LVOT STROKE VOLUME: 93.82 ML
E WAVE DECELERATION TIME: 408 MS
E/A RATIO: 0.6
E/E' RATIO: 18.9
E/LAT E' RATIO: 11.4
EDV (BP): 61 CM3
EF (A4C): 65.2 %
EJECTION FRACTION: 60.5 %
EST RIGHT VENT SYSTOLIC PRESSURE BY TRICUSPID REGURGITATION JET: 39 MMHG
ESV (BP): 24.1 CM3
FRACTIONAL SHORTENING: 38.6 %
INTERVENTRICULAR SEPTUM: 0.9 CM
LA/AORTA RATIO: 1.55
LAAS-AP2: 23.6 CM2
LAAS-AP4: 23.2 CM2
LAD 2D - M-MODE: 4.6 CM
LAD 2D - M-MODE: 4.6 CM
LAD 2D: 4.5 CM
LALD A4C: 5.39 CM
LALD A4C: 5.61 CM
LEFT ATRIUM VOLUME INDEX: 48.04 CM3/M2
LEFT ATRIUM VOLUME: 80.7 CM3
LEFT INTERNAL DIMENSION IN SYSTOLE: 2.55 CM (ref 2.33–3.52)
LEFT VENTRICLE DIASTOLIC VOLUME INDEX: 35 CM3/M2
LEFT VENTRICLE DIASTOLIC VOLUME: 58.8 CM3
LEFT VENTRICLE SYSTOLIC VOLUME INDEX: 12.2 CM3/M2
LEFT VENTRICLE SYSTOLIC VOLUME: 20.5 CM3
LEFT VENTRICULAR INTERNAL DIMENSION IN DIASTOLE: 4.15 CM (ref 3.92–5.44)
LEFT VENTRICULAR POSTERIOR WALL IN END DIASTOLE: 0.87 CM (ref 0.51–0.95)
LV DIASTOLIC VOLUME: 60.7 CM3
LV ESV (APICAL 2 CHAMBER): 27.9 CM3
LVAD-AP2: 22.2 CM2
LVAD-AP4: 21.9 CM2
LVAS-AP2: 13.2 CM2
LVAS-AP4: 11.8 CM2
LVEDVI(A2C): 36.13 CM3/M2
LVEDVI(BP): 36.31 CM3/M2
LVESVI(A2C): 16.61 CM3/M2
LVESVI(BP): 14.35 CM3/M2
LVLD-AP2: 6.87 CM
LVLD-AP4: 7.19 CM
LVLS-AP2: 5.84 CM
LVLS-AP4: 6.01 CM
LVOT 2D: 2.1 CM
LVOT A: 3.46 CM2
LVOT MG: 3 MMHG
LVOT MV: 0.72 M/S
LVOT PEAK VELOCITY: 1.16 M/S
LVOT VTI: 27.1 CM
MV E'TISSUE VEL-LAT: 0.06 M/S
MV E'TISSUE VEL-MED: 0.04 M/S
MV PEAK A VEL: 1.3 M/S
MV PEAK E VEL: 0.72 M/S
POSTERIOR WALL: 0.87 CM
PV PEAK GRADIENT: 6 MMHG
PV PV: 1.22 M/S
RVOT VMAX: 0.83 M/S
RVOT VTI: 17 CM
SEPTAL TISSUE DOPPLER FREE WALL LATE DIA VELOCITY (APICAL 4 CHAMBER VIEW): 0.16 M/S
TR MAX PG: 36 MMHG
TRICUSPID VALVE PEAK REGURGITATION VELOCITY: 2.99 M/S
Z-SCORE OF LEFT VENTRICULAR DIMENSION IN END DIASTOLE: -1.06
Z-SCORE OF LEFT VENTRICULAR DIMENSION IN END SYSTOLE: -0.91
Z-SCORE OF LEFT VENTRICULAR POSTERIOR WALL IN END DIASTOLE: 1.19

## 2021-11-10 PROCEDURE — 99214 OFFICE O/P EST MOD 30 MIN: CPT | Performed by: INTERNAL MEDICINE

## 2021-11-10 PROCEDURE — 3008F BODY MASS INDEX DOCD: CPT | Performed by: INTERNAL MEDICINE

## 2021-11-10 PROCEDURE — 93306 TTE W/DOPPLER COMPLETE: CPT | Performed by: INTERNAL MEDICINE

## 2021-11-10 NOTE — LETTER
November 10, 2021     Talat Herring MD  [address] [facility] [address]    Patient: Lesli Luz  YOB: 1932  Date of Visit: 11/10/2021      Dear Dr. Herring:    Thank you for referring Lesli Luz to me for evaluation. Below are my notes for this consultation.    If you have questions, please do not hesitate to call me. I look forward to following your patient along with you.         Sincerely,        Randal Mcghee MD        CC: No Recipients  Randal Mcghee MD  11/10/2021 11:00 AM  Signed       Cardiology Outpatient Note    [facility] Office  [address] [place] [address]     [facility]  [facility]  [address] [place] [address]     TEL  [phone]  [facility]hc     Lesli Luz is a 89 y.o. female patient here for cardiac evaluation.  She has general cardiology issues but saw Dr. Spencer inadvertently 8-2021 had no electrophysiologic issues and was asked to see general cardiology.    This history is taken from review of the chart.  The patient's history is not very specific.    She initially went to see Dr. Spencer hoping to change her cardiology care.  She had been seen at [facility] 1-2021 with right-sided discomfort.  As best as I can tell from the notes and her history, she did not have a cardiac etiology for her symptoms which were felt to be musculoskeletal but had elevated blood pressure 154/112 mmHg as documented and was asked to see cardiology.  She saw cardiology as an outpatient in office on [facility] with a cardiologist whose name she cannot remember.      She had an echocardiogram in the office today which I personally reviewed.  Patient has normal left ventricular function and normal wall motion.  Her blood pressure is controlled today.  She still gets occasional right-sided discomfort but epigastric discomfort also when she drinks liquids.  She says she got a  "call from her primary care physician's office telling her that she had \" bad bacteria in her stomach\" but denies ever having an EGD.  She said she will be scheduling consultation with gastroenterology.  Blood work  also shows hemoglobin 9.5.  Hemoglobin  was 11.0.  She denies melena but has described hemorrhoidal bleeding.  She says she has an appointment scheduled with hematology as well.    She has a history of hypertension.  No history of diabetes congestive heart failure rheumatic heart disease.  She said she had a stroke in the  but had forgotten all about it.  She says her symptoms at that time consisted of perioral numbness which she currently does not have.  She denies a history of myocardial infarction, cardiac stenting, or cardiac surgery.    Her past medical history is notable for deep venous thrombosis lower extremities.  She said she first had them in .  They recurred a few years later.  She says no one ever told her why she had those.  She was initially on warfarin which she said did not work.  She denies bleeding at that time.  She is currently on apixaban, managed by her primary care physician.  I have no notes or prior history or records to review.    She also has a history of chronic kidney disease with last creatinine 2.93 and sees Dr. Tracey Bills.                                                             [facility]     Medical History:  Past Medical History:   Diagnosis Date   • Chronic kidney disease    • Deep vein thrombosis (CMS/HCC)    • Hypertension    • Lipid disorder        Surgical History:No past surgical history on file.    Social History: reports that she has quit smoking. She has never used smokeless tobacco. She reports previous alcohol use. She reports that she does not use drugs.    Family History: She indicated that her biological mother is . She indicated that the status of her biological sister is unknown.      Allergies:Patient has no known " "allergies.    Current Medications:    Outpatient Encounter Medications as of 11/10/2021:   •  amLODIPine (NORVASC) 10 mg tablet, Take 10 mg by mouth once daily. for high blood pressure  •  ammonium lactate (LAC-HYDRIN) 12 % lotion, Apply topically as needed.  •  apixaban (ELIQUIS) 2.5 mg tablet, Take 2.5 mg by mouth 2 (two) times a day.  •  docusate sodium (COLACE) 100 mg capsule, Take 100 mg by mouth daily.  •  furosemide (LASIX) 40 mg tablet, Take 40 mg by mouth daily as needed.    •  losartan (COZAAR) 100 mg tablet, Take 100 mg by mouth daily.  •  rosuvastatin (CRESTOR) 10 mg tablet, Take 10 mg by mouth daily.  •  pantoprazole (PROTONIX) 40 mg EC tablet, Take 40 mg by mouth daily.                                                          OBJECTIVE   ROS as in HPI   Constitution: Negative for chills and fever.   Eyes: Negative for blurred vision and visual disturbance.   Cardiovascular: Negative for anginal chest pain, dyspnea on exertion, near-syncope, palpitations and syncope.   Respiratory: Negative for hemoptysis, shortness of breath and wheezing.    Hematologic/Lymphatic: Negative for bleeding problem.   Skin: Negative for rash. No new skin changes  Gastrointestinal: Occasional epigastric discomfort but no, diarrhea, hematochezia, melena, nausea and vomiting.   Genitourinary: Negative for dysuria and hematuria.   Neurological: Negative for headaches.            Vitals:    11/10/21 1021   BP: 126/60   BP Location: Left upper arm   Patient Position: Sitting   Pulse: (!) 58   Resp: 16   Weight: 65.5 kg (144 lb 6.4 oz)   Height: 1.549 m (5' 1\")       BP Readings from Last 3 Encounters:   11/10/21 126/60   11/10/21 126/70   10/08/21 110/72     Wt Readings from Last 3 Encounters:   11/10/21 65.5 kg (144 lb 6.4 oz)   11/10/21 65.3 kg (144 lb)   10/08/21 68.5 kg (151 lb)       Physical Exam   Constitutional: Appears comfortable in no distress  HEENT:  Neck Supple.  No JVD No carotid bruits no cervical " lymphadenopathy  Head: Normocephalic.   Eyes: Extraocular movements intact  Cardiovascular: Normal rate, regular rhythm 2 out of 6 systolic murmur right sternal border Exam reveals no friction rub.    Pulmonary/Chest: Effort normal and breath sounds normal. No wheezes.  Abdominal: Soft and nontender.   Musculoskeletal: No lower extremity edema.   Neurological: Alert and oriented to person, place, and time.  Stable gait but patient says she normally walks with a cane  Skin: Skin is warm and dry.   Psychiatric: Behavior is normal.            Objective   No results found for: CHOL  No results found for: HDL  No results found for: LDLCALC  No results found for: TRIG  No results found for: ALT, AST  No results found for: WBC, HGB, HCT, PLT, INR  No results found for: GLUCOSE, NA, K, CO2, CL, BUN, CREATININE  No results found for: HGBA1C  No results found for: TSH  No results found for: BNP  [unfilled]    Troponin I Results    No lab values to display.                                                            IMAGING     Transthoracic echocardiogram 11-10-21  · Normal-sized LV. Estimated EF 65%. No regional wall motion abnormalities. Normal LV wall thickness. LV diastolic inflow pattern consistent with impaired relaxation.  · No evidence of pericardial effusion.  · Aortic root sclerotic.  · Normal leaflet motion of the mitral valve. Sclerotic mitral valve. Mild mitral annular calcification.  · Tricuspid aortic valve. Sclerotic aortic valve leaflets. No aortic valve regurgitation. No aortic valve stenosis.  · Mild tricuspid valve regurgitation. The regurgitation jet is eccentric.  · Estimated RVSP = 39 mmHg.  · Pulmonic valve not well visualized.  · Normal-sized RA.  · Normal-sized RV. Normal RV systolic function.  · Mildly dilated LA.  · Trace mitral valve regurgitation.  · IVC is a small caliber vessel (<1.7cm). IVC collapses >50% during inspiration.        EKG 11/10/2021   Sinus  Rhythm 70bpm [id number]  Low voltage in  precordial leads.    -Poor R-wave progression                                            ASSESSMENT AND PLAN   Ms. Luz is an 89-year-old woman with chronic kidney disease, hypertension, chronic deep venous thrombosis and the following issues:    Assessment/Plan    Chest pain  Patient has atypical chest discomfort which does not appear to be anginal or cardiac in origin.  No clear exacerbation with exertion or walking.  Echocardiogram showed normal left ventricular function with normal wall motion and mild degenerative valvular disease.  No further cardiac testing recommended at this time.  Patient will follow up with gastroenterology.      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  Currently on apixaban.    Hyperlipidemia  Blood work  shows  triglycerides 69 HDL 73 normal liver function test.  In light of patient's age, the benefit of statin is questionable and I explained this to her.  She is however interested in taking anything that she thinks will help her.  She is currently on rosuvastatin as prescribed by her previous cardiologist.  She will continue for now and may have blood work in the future to see if there is an improvement in her lipid profile.              Return in about 6 months (around 5/10/2022).         Thank you for allowing me to participate in the care of this patient.  I hope this information is helpful.         Randal Mcghee MD [facility]  11/10/2021  10:59 AM    This document was generated utilizing voice recognition technology. A reasonable attempt at proofreading has been made to minimize errors but please excuse any typographical errors which may be present. Please call with any questions.

## 2021-11-10 NOTE — PROGRESS NOTES
"     Cardiology Outpatient Note    [facility]    [facility] Office  [address] [place] [address]     [facility]  [facility] [address] [facility]  [address] [place] [address]     TEL  [phone]  [facility]/mlhc     Lesli Luz is a 89 y.o. female patient here for cardiac evaluation.  She has general cardiology issues but saw Dr. Spencer inadvertently 8-2021 had no electrophysiologic issues and was asked to see general cardiology.    This history is taken from review of the chart.  The patient's history is not very specific.    She initially went to see Dr. Spencer hoping to change her cardiology care.  She had been seen at [facility] 1-2021 with right-sided discomfort.  As best as I can tell from the notes and her history, she did not have a cardiac etiology for her symptoms which were felt to be musculoskeletal but had elevated blood pressure 154/112 mmHg as documented and was asked to see cardiology.  She saw cardiology as an outpatient in office on [facility] with a cardiologist whose name she cannot remember.      She had an echocardiogram in the office today which I personally reviewed.  Patient has normal left ventricular function and normal wall motion.  Her blood pressure is controlled today.  She still gets occasional right-sided discomfort but epigastric discomfort also when she drinks liquids.  She says she got a call from her primary care physician's office telling her that she had \" bad bacteria in her stomach\" but denies ever having an EGD.  She said she will be scheduling consultation with gastroenterology.  Blood work 9-2021 also shows hemoglobin 9.5.  Hemoglobin  was 11.0.  She denies melena but has described hemorrhoidal bleeding.  She says she has an appointment scheduled with hematology as well.    She has a history of hypertension.  No history of diabetes congestive heart failure rheumatic heart disease.  She " said she had a stroke in the  but had forgotten all about it.  She says her symptoms at that time consisted of perioral numbness which she currently does not have.  She denies a history of myocardial infarction, cardiac stenting, or cardiac surgery.    Her past medical history is notable for deep venous thrombosis lower extremities.  She said she first had them in .  They recurred a few years later.  She says no one ever told her why she had those.  She was initially on warfarin which she said did not work.  She denies bleeding at that time.  She is currently on apixaban, managed by her primary care physician.  I have no notes or prior history or records to review.    She also has a history of chronic kidney disease with last creatinine 2.93 and sees Dr. Tracey Bills.                                                             [facility]     Medical History:  Past Medical History:   Diagnosis Date   • Chronic kidney disease    • Deep vein thrombosis (CMS/HCC)    • Hypertension    • Lipid disorder        Surgical History:No past surgical history on file.    Social History: reports that she has quit smoking. She has never used smokeless tobacco. She reports previous alcohol use. She reports that she does not use drugs.    Family History: She indicated that her biological mother is . She indicated that the status of her biological sister is unknown.      Allergies:Patient has no known allergies.    Current Medications:    Outpatient Encounter Medications as of 11/10/2021:   •  amLODIPine (NORVASC) 10 mg tablet, Take 10 mg by mouth once daily. for high blood pressure  •  ammonium lactate (LAC-HYDRIN) 12 % lotion, Apply topically as needed.  •  apixaban (ELIQUIS) 2.5 mg tablet, Take 2.5 mg by mouth 2 (two) times a day.  •  docusate sodium (COLACE) 100 mg capsule, Take 100 mg by mouth daily.  •  furosemide (LASIX) 40 mg tablet, Take 40 mg by mouth daily as needed.    •  losartan (COZAAR) 100 mg tablet, Take 100  "mg by mouth daily.  •  rosuvastatin (CRESTOR) 10 mg tablet, Take 10 mg by mouth daily.  •  pantoprazole (PROTONIX) 40 mg EC tablet, Take 40 mg by mouth daily.                                                          OBJECTIVE   ROS as in HPI   Constitution: Negative for chills and fever.   Eyes: Negative for blurred vision and visual disturbance.   Cardiovascular: Negative for anginal chest pain, dyspnea on exertion, near-syncope, palpitations and syncope.   Respiratory: Negative for hemoptysis, shortness of breath and wheezing.    Hematologic/Lymphatic: Negative for bleeding problem.   Skin: Negative for rash. No new skin changes  Gastrointestinal: Occasional epigastric discomfort but no, diarrhea, hematochezia, melena, nausea and vomiting.   Genitourinary: Negative for dysuria and hematuria.   Neurological: Negative for headaches.            Vitals:    11/10/21 1021   BP: 126/60   BP Location: Left upper arm   Patient Position: Sitting   Pulse: (!) 58   Resp: 16   Weight: 65.5 kg (144 lb 6.4 oz)   Height: 1.549 m (5' 1\")       BP Readings from Last 3 Encounters:   11/10/21 126/60   11/10/21 126/70   10/08/21 110/72     Wt Readings from Last 3 Encounters:   11/10/21 65.5 kg (144 lb 6.4 oz)   11/10/21 65.3 kg (144 lb)   10/08/21 68.5 kg (151 lb)       Physical Exam   Constitutional: Appears comfortable in no distress  HEENT:  Neck Supple.  No JVD No carotid bruits no cervical lymphadenopathy  Head: Normocephalic.   Eyes: Extraocular movements intact  Cardiovascular: Normal rate, regular rhythm 2 out of 6 systolic murmur right sternal border Exam reveals no friction rub.    Pulmonary/Chest: Effort normal and breath sounds normal. No wheezes.  Abdominal: Soft and nontender.   Musculoskeletal: No lower extremity edema.   Neurological: Alert and oriented to person, place, and time.  Stable gait but patient says she normally walks with a cane  Skin: Skin is warm and dry.   Psychiatric: Behavior is normal.          "   Objective   No results found for: CHOL  No results found for: HDL  No results found for: LDLCALC  No results found for: TRIG  No results found for: ALT, AST  No results found for: WBC, HGB, HCT, PLT, INR  No results found for: GLUCOSE, NA, K, CO2, CL, BUN, CREATININE  No results found for: HGBA1C  No results found for: TSH  No results found for: BNP  [unfilled]    Troponin I Results    No lab values to display.                                                            IMAGING     Transthoracic echocardiogram 11-10-21  · Normal-sized LV. Estimated EF 65%. No regional wall motion abnormalities. Normal LV wall thickness. LV diastolic inflow pattern consistent with impaired relaxation.  · No evidence of pericardial effusion.  · Aortic root sclerotic.  · Normal leaflet motion of the mitral valve. Sclerotic mitral valve. Mild mitral annular calcification.  · Tricuspid aortic valve. Sclerotic aortic valve leaflets. No aortic valve regurgitation. No aortic valve stenosis.  · Mild tricuspid valve regurgitation. The regurgitation jet is eccentric.  · Estimated RVSP = 39 mmHg.  · Pulmonic valve not well visualized.  · Normal-sized RA.  · Normal-sized RV. Normal RV systolic function.  · Mildly dilated LA.  · Trace mitral valve regurgitation.  · IVC is a small caliber vessel (<1.7cm). IVC collapses >50% during inspiration.        EKG 11/10/2021   Sinus  Rhythm 70bpm [id number]  Low voltage in precordial leads.    -Poor R-wave progression                                            ASSESSMENT AND PLAN   Ms. Luz is an 89-year-old woman with chronic kidney disease, hypertension, chronic deep venous thrombosis and the following issues:    Assessment/Plan    Chest pain  Patient has atypical chest discomfort which does not appear to be anginal or cardiac in origin.  No clear exacerbation with exertion or walking.  Echocardiogram showed normal left ventricular function with normal wall motion and mild degenerative valvular disease.   No further cardiac testing recommended at this time.  Patient will follow up with gastroenterology.      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  Currently on apixaban.    Hyperlipidemia  Blood work  shows  triglycerides 69 HDL 73 normal liver function test.  In light of patient's age, the benefit of statin is questionable and I explained this to her.  She is however interested in taking anything that she thinks will help her.  She is currently on rosuvastatin as prescribed by her previous cardiologist.  She will continue for now and may have blood work in the future to see if there is an improvement in her lipid profile.              Return in about 6 months (around 5/10/2022).         Thank you for allowing me to participate in the care of this patient.  I hope this information is helpful.         Randal Mcghee MD [facility]  11/10/2021  10:59 AM    This document was generated utilizing voice recognition technology. A reasonable attempt at proofreading has been made to minimize errors but please excuse any typographical errors which may be present. Please call with any questions.

## 2021-11-10 NOTE — ASSESSMENT & PLAN NOTE
Patient has atypical chest discomfort which does not appear to be anginal or cardiac in origin.  No clear exacerbation with exertion or walking.  Echocardiogram showed normal left ventricular function with normal wall motion and mild degenerative valvular disease.  No further cardiac testing recommended at this time.  Patient will follow up with gastroenterology.